# Patient Record
Sex: MALE | Race: WHITE | Employment: FULL TIME | URBAN - METROPOLITAN AREA
[De-identification: names, ages, dates, MRNs, and addresses within clinical notes are randomized per-mention and may not be internally consistent; named-entity substitution may affect disease eponyms.]

---

## 2022-01-16 ENCOUNTER — HOSPITAL ENCOUNTER (EMERGENCY)
Age: 52
Discharge: HOME | End: 2022-01-16
Attending: EMERGENCY MEDICINE
Payer: MEDICAID

## 2022-01-16 VITALS
TEMPERATURE: 98.9 F | WEIGHT: 214.73 LBS | SYSTOLIC BLOOD PRESSURE: 144 MMHG | RESPIRATION RATE: 16 BRPM | BODY MASS INDEX: 29.08 KG/M2 | HEIGHT: 72 IN | DIASTOLIC BLOOD PRESSURE: 97 MMHG | HEART RATE: 85 BPM | OXYGEN SATURATION: 97 %

## 2022-01-16 DIAGNOSIS — H66.002 LEFT ACUTE SUPPURATIVE OTITIS MEDIA: Primary | ICD-10-CM

## 2022-01-16 DIAGNOSIS — H69.82 ACUTE DYSFUNCTION OF LEFT EUSTACHIAN TUBE: ICD-10-CM

## 2022-01-16 PROCEDURE — 99283 EMERGENCY DEPT VISIT LOW MDM: CPT

## 2022-01-16 RX ORDER — PREDNISONE 20 MG/1
40 TABLET ORAL DAILY
Qty: 10 TABLET | Refills: 0 | Status: SHIPPED | OUTPATIENT
Start: 2022-01-16 | End: 2022-01-21

## 2022-01-16 RX ORDER — AMOXICILLIN 875 MG/1
875 TABLET, COATED ORAL 2 TIMES DAILY
Qty: 20 TABLET | Refills: 0 | Status: SHIPPED | OUTPATIENT
Start: 2022-01-16 | End: 2022-01-26

## 2022-01-16 ASSESSMENT — PAIN SCALES - GENERAL
PAINLEVEL_OUTOF10: 0
PAINLEVEL_OUTOF10: 0

## 2022-01-16 NOTE — ED TRIAGE NOTES
pt. c/o hearing loss in left ear onset 2 weeks ago, no pain. No trauma, no injury or drainage. Has been taking Sudafed.

## 2022-01-16 NOTE — ED PROVIDER NOTES
[facility]  eMERGENCY dEPARTMENT eNCOUnter      Pt Name: Elliott Ramachandran  MRN: [id number]  [place] 1970  Date of evaluation: 1/16/2022  Provider: Osvaldo Mcclure MD    [address] [place] [address]       Chief Complaint   Patient presents with    Hearing Loss     pt. c/o hearing loss in left ear onset 2 weeks ago, no pain         HISTORY OF PRESENT ILLNESS  (Location/Symptom, Timing/Onset, Context/Setting, Quality, Duration, Modifying Factors, Severity.)   Elliott Ramachandran is a 46 y.o. male who presents to the emergency department complaining of decreased hearing in his left ear for the last 2 weeks. He states he has had some nasal congestion and sinus congestion. He has been taking some Sudafed. He seen no improvement. He states he has had ear infections in the past. He states he usually takes antibiotics and within two or 3 days his ear always gets better. No cough. No fever. No body aches. No chills. No headache. No drainage. Nursing Notes were reviewed and I agree. REVIEW OF SYSTEMS    (2-9 systems for level 4, 10 or more for level 5)     HEENT: Decreased hearing in his left ear for 2 weeks. Nasal and sinus congestion. No sore throat. Respiratory: No cough or shortness of breath. Musculoskeletal: No myalgias or body aches. General: No fever. Except as noted above the remainder of the review of systems was reviewed and negative. PAST MEDICAL HISTORY   History reviewed. No pertinent past medical history. SURGICAL HISTORY           Procedure Laterality Date    TONSILLECTOMY         CURRENT MEDICATIONS       Previous Medications    No medications on file       ALLERGIES     Patient has no known allergies. FAMILY HISTORY     History reviewed. No pertinent family history. No family status information on file. SOCIAL HISTORY      reports that he has never smoked. He has never used smokeless tobacco. He reports previous alcohol use. He reports current drug use. Drug: Marijuana Alpheus Khushbu). PHYSICAL EXAM    (up to 7 for level 4, 8 or more for level 5)     ED Triage Vitals [01/16/22 1217]   BP Temp Temp Source Pulse Resp SpO2 Height Weight   (!) 144/97 98.9 °F (37.2 °C) Oral 85 16 97 % 6' (1.829 m) 214 lb 11.7 oz (97.4 kg)       General: Alert well-appearing male in no acute distress. Head: Atraumatic and normocephalic. Eyes: No conjunctival injection. No pallor. Pupils equal round reactive. ENT: Left TM is bulging, mildly erythematous with purulent fluid behind it. Ear canal is normal. No perforation. Right TM and ear canal are normal. Nose is clear. Oropharynx is moist without erythema. Neck: Supple, nontender, no adenopathy. Heart: Regular rate and rhythm. No murmurs or gallops noted. Lungs: Breath sounds equal bilaterally and clear. DIAGNOSTIC RESULTS     RADIOLOGY:   Non-plain film images such as CT, Ultrasound and MRI are read by the radiologist. Plain radiographic images are visualized and preliminarily interpreted by Padmini Villalpando MD with the below findings:        Interpretation per the Radiologist below, if available at the time of this note:    No orders to display       LABS:  Labs Reviewed - No data to display    All other labs were within normal range or not returned as of this dictation. EMERGENCY DEPARTMENT COURSE and DIFFERENTIAL DIAGNOSIS/MDM:   Vitals:    Vitals:    01/16/22 1217   BP: (!) 144/97   Pulse: 85   Resp: 16   Temp: 98.9 °F (37.2 °C)   TempSrc: Oral   SpO2: 97%   Weight: 214 lb 11.7 oz (97.4 kg)   Height: 6' (1.829 m)       This patient presents with some decreased hearing in his left ear. He is not really having pain. He does report some \"popping\" and \"sloshing\" in his left ear. No improvement with Sudafed. He has a suppurative otitis media. I think he has some eustachian tube dysfunction. He was put on amoxicillin. He was put on a 5-day course of prednisone.  He was told if its not improved in 7 to 10 days he should follow-up. He was given an ENT referral. Diagnosis and treatment plan were discussed with the patient. He understands the treatment plan and follow-up as discussed. PROCEDURES:  None    FINAL IMPRESSION      1. Left acute suppurative otitis media    2.  Acute dysfunction of left eustachian tube          DISPOSITION/PLAN   DISPOSITION Decision To Discharge 01/16/2022 12:25:35 PM      PATIENT REFERRED TO:  [facility]) ENT  [phone]  In 1 week  If not improved      DISCHARGE MEDICATIONS:  New Prescriptions    AMOXICILLIN (AMOXIL) 875 MG TABLET    Take 1 tablet by mouth 2 times daily for 10 days    PREDNISONE (DELTASONE) 20 MG TABLET    Take 2 tablets by mouth daily for 5 days       (Please note that portions of this note were completed with a voice recognition program.  Efforts were made to edit the dictations but occasionally words are mis-transcribed.)    Itz Sheridan MD  Attending Emergency Physician        Naty Manzo MD  01/16/22 6399

## 2022-04-27 ENCOUNTER — HOSPITAL ENCOUNTER (EMERGENCY)
Age: 52
Discharge: HOME | End: 2022-04-27
Attending: EMERGENCY MEDICINE
Payer: MEDICAID

## 2022-04-27 VITALS
HEIGHT: 72 IN | HEART RATE: 81 BPM | SYSTOLIC BLOOD PRESSURE: 142 MMHG | TEMPERATURE: 98 F | OXYGEN SATURATION: 98 % | WEIGHT: 225 LBS | DIASTOLIC BLOOD PRESSURE: 92 MMHG | RESPIRATION RATE: 17 BRPM | BODY MASS INDEX: 30.48 KG/M2

## 2022-04-27 DIAGNOSIS — H66.002 ACUTE SUPPURATIVE OTITIS MEDIA OF LEFT EAR WITHOUT SPONTANEOUS RUPTURE OF TYMPANIC MEMBRANE, RECURRENCE NOT SPECIFIED: ICD-10-CM

## 2022-04-27 DIAGNOSIS — H65.01 RIGHT ACUTE SEROUS OTITIS MEDIA, RECURRENCE NOT SPECIFIED: ICD-10-CM

## 2022-04-27 DIAGNOSIS — R10.13 ABDOMINAL PAIN, EPIGASTRIC: Primary | ICD-10-CM

## 2022-04-27 LAB
A/G RATIO: 1.8 (ref 1.1–2.2)
ALBUMIN SERPL-MCNC: 4.5 G/DL (ref 3.4–5)
ALP BLD-CCNC: 58 U/L (ref 40–129)
ALT SERPL-CCNC: 40 U/L (ref 10–40)
ANION GAP SERPL CALCULATED.3IONS-SCNC: 10 MMOL/L (ref 3–16)
AST SERPL-CCNC: 22 U/L (ref 15–37)
BASOPHILS ABSOLUTE: 0 K/UL (ref 0–0.2)
BASOPHILS RELATIVE PERCENT: 0 %
BILIRUB SERPL-MCNC: 0.5 MG/DL (ref 0–1)
BUN BLDV-MCNC: 14 MG/DL (ref 7–20)
CALCIUM SERPL-MCNC: 9.4 MG/DL (ref 8.3–10.6)
CHLORIDE BLD-SCNC: 102 MMOL/L (ref 99–110)
CO2: 26 MMOL/L (ref 21–32)
CREAT SERPL-MCNC: 0.9 MG/DL (ref 0.9–1.3)
EOSINOPHILS ABSOLUTE: 0.1 K/UL (ref 0–0.6)
EOSINOPHILS RELATIVE PERCENT: 1 %
GFR AFRICAN AMERICAN: >60
GFR NON-AFRICAN AMERICAN: >60
GLUCOSE BLD-MCNC: 132 MG/DL (ref 70–99)
HCT VFR BLD CALC: 43.6 % (ref 40.5–52.5)
HEMOGLOBIN: 14.5 G/DL (ref 13.5–17.5)
LIPASE: 36 U/L (ref 13–60)
LYMPHOCYTES ABSOLUTE: 3.1 K/UL (ref 1–5.1)
LYMPHOCYTES RELATIVE PERCENT: 49 %
MCH RBC QN AUTO: 28.2 PG (ref 26–34)
MCHC RBC AUTO-ENTMCNC: 33.3 G/DL (ref 31–36)
MCV RBC AUTO: 84.7 FL (ref 80–100)
MONOCYTES ABSOLUTE: 0.3 K/UL (ref 0–1.3)
MONOCYTES RELATIVE PERCENT: 4 %
NEUTROPHILS ABSOLUTE: 2.9 K/UL (ref 1.7–7.7)
NEUTROPHILS RELATIVE PERCENT: 46 %
OCCULT BLOOD DIAGNOSTIC: NORMAL
PDW BLD-RTO: 12.3 % (ref 12.4–15.4)
PLATELET # BLD: 279 K/UL (ref 135–450)
PLATELET SLIDE REVIEW: ADEQUATE
PMV BLD AUTO: 7.9 FL (ref 5–10.5)
POTASSIUM SERPL-SCNC: 4 MMOL/L (ref 3.5–5.1)
RBC # BLD: 5.15 M/UL (ref 4.2–5.9)
SLIDE REVIEW: ABNORMAL
SODIUM BLD-SCNC: 138 MMOL/L (ref 136–145)
TOTAL PROTEIN: 7 G/DL (ref 6.4–8.2)
WBC # BLD: 6.3 K/UL (ref 4–11)

## 2022-04-27 PROCEDURE — G0328 FECAL BLOOD SCRN IMMUNOASSAY: HCPCS

## 2022-04-27 PROCEDURE — 85025 COMPLETE CBC W/AUTO DIFF WBC: CPT

## 2022-04-27 PROCEDURE — 36415 COLL VENOUS BLD VENIPUNCTURE: CPT

## 2022-04-27 PROCEDURE — 83690 ASSAY OF LIPASE: CPT

## 2022-04-27 PROCEDURE — 99283 EMERGENCY DEPT VISIT LOW MDM: CPT

## 2022-04-27 PROCEDURE — 80053 COMPREHEN METABOLIC PANEL: CPT

## 2022-04-27 RX ORDER — OMEPRAZOLE 20 MG/1
20 CAPSULE, DELAYED RELEASE ORAL
Qty: 60 CAPSULE | Refills: 0 | Status: SHIPPED | OUTPATIENT
Start: 2022-04-27

## 2022-04-27 RX ORDER — AMOXICILLIN AND CLAVULANATE POTASSIUM 875; 125 MG/1; MG/1
1 TABLET, FILM COATED ORAL 2 TIMES DAILY
Qty: 14 TABLET | Refills: 0 | Status: SHIPPED | OUTPATIENT
Start: 2022-04-27 | End: 2022-05-04

## 2022-04-27 RX ORDER — LORATADINE 10 MG/1
10 TABLET ORAL DAILY
Qty: 14 TABLET | Refills: 0 | Status: SHIPPED | OUTPATIENT
Start: 2022-04-27

## 2022-04-27 ASSESSMENT — PAIN - FUNCTIONAL ASSESSMENT
PAIN_FUNCTIONAL_ASSESSMENT: NONE - DENIES PAIN
PAIN_FUNCTIONAL_ASSESSMENT: NONE - DENIES PAIN

## 2022-04-27 NOTE — ED PROVIDER NOTES
[facility]  eMERGENCY dEPARTMENT eNCOUnter      Pt Name: Derrick Solomon  MRN: [id number]  [place] 1970  Date of evaluation: 4/27/2022  Provider: Nadege Barahona MD    [address] [place] [address]       Chief Complaint   Patient presents with    Otalgia     Pt c/ bilateral ear pain worse on the left. Pt also c/o abdominal pain x 1 month with nausea. Pt denies vomiting. Pt states intensity of pain comes and goes.  Abdominal Pain         CRITICAL CARE TIME   Total Critical Care time was 0 minutes, excluding separately reportable procedures. There was a high probability of clinically significant/life threatening deterioration in the patient's condition which required my urgent intervention. HISTORY OF PRESENT ILLNESS  (Location/Symptom, Timing/Onset, Context/Setting, Quality, Duration, Modifying Factors, Severity.)   Derrick Solomon is a 46 y.o. male who presents to the emergency department complaining of a earache bilaterally and stomach pain. He states he had an ear infection a couple months ago. He was treated for the ear infection. He got better. About a week later it came back. His symptoms are mainly discomfort or pressure in his ears bilaterally, left greater than right. No drainage. No rhinorrhea or sore throat. He also says \"I think I have an ulcer\". He has been having intermittent sharp epigastric abdominal pain for about a month. He has on a daily basis. It is worse after eating. No nausea or vomiting. He does drink a lot of caffeine. He does not drink alcohol. He states the last 2 days he noticed his stools were black in color. He has not seen any blood. He is taken over-the-counter medications, he does not know the names but they have not helped. He states he did not take Pepto-Bismol. Nursing Notes were reviewed and I agree.     REVIEW OF SYSTEMS    (2-9 systems for level 4, 10 or more for level 5)     HEENT: Bilateral ear pain and decreased hearing. No nasal congestion or sore throat. Cardiovascular: No chest pain. Pulmonary: No shortness of breath or cough. GI: Intermittent sharp epigastric abdominal pain. It does not radiate. No nausea or vomiting. It is worse after eating. Dark stools for the last 2 days. No diarrhea. No blood. He is not having any pain at this time. His last pain was this morning. : No frequency urgency or dysuria. Neuro: No dizziness or passing out. Except as noted above the remainder of the review of systems was reviewed and negative. PAST MEDICAL HISTORY   History reviewed. No pertinent past medical history. SURGICAL HISTORY       Past Surgical History:   Procedure Laterality Date    TONSILLECTOMY           CURRENT MEDICATIONS       Previous Medications    No medications on file       ALLERGIES     Patient has no known allergies. FAMILY HISTORY     History reviewed. No pertinent family history. SOCIAL HISTORY       Social History     Socioeconomic History    Marital status:      Spouse name: None    Number of children: None    Years of education: None    Highest education level: None   Occupational History    None   Tobacco Use    Smoking status: Never Smoker    Smokeless tobacco: Never Used   Vaping Use    Vaping Use: Never used   Substance and Sexual Activity    Alcohol use: Not Currently    Drug use: Yes     Types: Marijuana Ivory Rachel)     Comment: daily    Sexual activity: Never   Other Topics Concern    None   Social History Narrative    None     Social Determinants of Health     Financial Resource Strain:     Difficulty of Paying Living Expenses: Not on file   Food Insecurity:     Worried About Running Out of Food in the Last Year: Not on file    Rosanna of Food in the Last Year: Not on file   Transportation Needs:     Lack of Transportation (Medical): Not on file    Lack of Transportation (Non-Medical):  Not on file   Physical Activity:     Days of Exercise per Week: Not on file    Minutes of Exercise per Session: Not on file   Stress:     Feeling of Stress : Not on file   Social Connections:     Frequency of Communication with Friends and Family: Not on file    Frequency of Social Gatherings with Friends and Family: Not on file    Attends Gnosticism Services: Not on file    Active Member of [address] [place] [address] [employer] or Organizations: Not on file    Attends Club or Organization Meetings: Not on file    Marital Status: Not on file   Intimate Partner Violence:     Fear of Current or Ex-Partner: Not on file    Emotionally Abused: Not on file    Physically Abused: Not on file    Sexually Abused: Not on file   Housing Stability:     Unable to Pay for Housing in the Last Year: Not on file    Number of [place] in the Last Year: Not on file    Unstable Housing in the Last Year: Not on file         PHYSICAL EXAM    (up to 7 for level 4, 8 or more for level 5)     ED Triage Vitals [04/27/22 1323]   BP Temp Temp Source Pulse Resp SpO2 Height Weight   (!) 142/92 98 °F (36.7 °C) Oral 81 17 98 % 6' (1.829 m) 225 lb (102.1 kg)       General: Alert moderately obese white male no acute distress. Head: Atraumatic and normocephalic. Eyes: No conjunctival injection. No pallor. No icterus. Pupils equal round reactive. ENT: Serous otitis media on the right. Purulent fluid behind the left TM with some mild erythema. No perforation. No drainage. Nose is clear. Oropharynx is moist without erythema. Neck: Supple, nontender, no adenopathy. Heart: Regular rate and rhythm. No murmurs or gallops noted. Lungs: Breath sounds equal bilaterally and clear. Abdomen: Soft, nondistended, nontender. No masses organomegaly. Bowel sounds are normal.  No flank tenderness. Rectal exam: Normal sphincter tone. No hemorrhoids. Dark brown stool. No gross blood. No masses. Neuro: Awake, alert, oriented. No focal motor deficits. Normal gait.       DIFFERENTIAL DIAGNOSIS   Differential includes but is not limited to serous otitis media, suppurative otitis media, gastritis, peptic ulcer disease, pancreatitis, biliary colic, cholecystitis, other. DIAGNOSTIC RESULTS     EKG: All EKG's are interpreted by Sukhjinder Waldrop MD in the absence of a cardiologist.      RADIOLOGY:   Non-plain film images such as CT, Ultrasound and MRI are read by the radiologist. Plain radiographic images are visualized and preliminarily interpreted Sukhjinder Waldrop MD with the below findings:      Interpretation per the Radiologist below, if available at the time of this note:    No orders to display         ED BEDSIDE ULTRASOUND:   Performed by ED Physician - none    LABS:  Labs Reviewed   CBC WITH AUTO DIFFERENTIAL - Abnormal; Notable for the following components:       Result Value    RDW 12.3 (*)     All other components within normal limits   COMPREHENSIVE METABOLIC PANEL - Abnormal; Notable for the following components:    Glucose 132 (*)     All other components within normal limits   LIPASE   BLOOD OCCULT STOOL DIAGNOSTIC    Narrative:     ORDER#: [id number]                          ORDERED BY: Priscilla Leonard  SOURCE: Stool                              COLLECTED:  04/27/22 14:40  ANTIBIOTICS AT JENSEN.:                      RECEIVED :  04/27/22 14:48       All other labs were within normal range or not returned as of this dictation. EMERGENCY DEPARTMENT COURSE and DIFFERENTIAL DIAGNOSIS/MDM:   Vitals:    Vitals:    04/27/22 1323   BP: (!) 142/92   Pulse: 81   Resp: 17   Temp: 98 °F (36.7 °C)   TempSrc: Oral   SpO2: 98%   Weight: 225 lb (102.1 kg)   Height: 6' (1.829 m)       This patient has 2 separate issues. He had a recent ear infection. It got better and then recurred. He has bilateral ear pain and decreased ability to hear. He had a serous otitis media on the right suppurative otitis media on the left. That will be treated with Augmentin. He is also having epigastric abdominal pain.   He says his stools have been black for the last 2 days. No blood. His stool is dark brown in color. It is Hemoccult negative. His H&H are stable. His renal function liver enzymes are normal.  His lipase is normal.  This could be gastritis versus peptic ulcer disease. I am going to put him on omeprazole. I am going to have him follow-up with his primary care physician and/or GI. I gave him a GI referral at his request.  He understands to return for worsening of symptoms or new symptoms of concern. He drinks a lot of caffeine. Have asked him to cut back on his caffeine or discontinue. He denies alcohol use. Diagnosis and treatment plan were discussed the patient. He understands treatment plan follow-up as discussed. CONSULTS:  None    PROCEDURES:  None    FINAL IMPRESSION      1. Abdominal pain, epigastric    2. Acute suppurative otitis media of left ear without spontaneous rupture of tympanic membrane, recurrence not specified    3.  Right acute serous otitis media, recurrence not specified          DISPOSITION/PLAN   DISPOSITION Decision To Discharge 04/27/2022 03:24:16 PM      PATIENT REFERRED TO:  Mady Panchal MD  [address] [place] [address]  [phone]    In 2 weeks        DISCHARGE MEDICATIONS:  New Prescriptions    AMOXICILLIN-CLAVULANATE (AUGMENTIN) 875-125 MG PER TABLET    Take 1 tablet by mouth 2 times daily for 7 days    LORATADINE (CLARITIN) 10 MG TABLET    Take 1 tablet by mouth daily    OMEPRAZOLE (PRILOSEC) 20 MG DELAYED RELEASE CAPSULE    Take 1 capsule by mouth 2 times daily (before meals)       (Please note that portions of this note were completed with a voice recognition program.  Efforts were made to edit the dictations but occasionally words are mis-transcribed.)    Ondina Soler MD  Attending Emergency Physician       Evangelist Mendez MD  04/27/22 4129